# Patient Record
Sex: MALE | Race: WHITE | NOT HISPANIC OR LATINO | URBAN - METROPOLITAN AREA
[De-identification: names, ages, dates, MRNs, and addresses within clinical notes are randomized per-mention and may not be internally consistent; named-entity substitution may affect disease eponyms.]

---

## 2023-09-24 PROBLEM — J34.2 DEVIATED NASAL SEPTUM: Status: ACTIVE | Noted: 2023-09-24

## 2023-09-24 PROBLEM — H90.3 SENSORINEURAL HEARING LOSS (SNHL), BILATERAL: Status: ACTIVE | Noted: 2023-09-24

## 2023-09-24 PROBLEM — H93.293 AUDITORY DISCRIMINATION IMPAIRMENT, BILATERAL: Status: ACTIVE | Noted: 2023-09-24

## 2023-09-24 RX ORDER — BIMATOPROST 0.1 MG/ML
SOLUTION/ DROPS OPHTHALMIC
COMMUNITY
Start: 2016-07-29

## 2023-09-24 RX ORDER — ESCITALOPRAM OXALATE 10 MG/1
TABLET ORAL
COMMUNITY
Start: 2020-09-16

## 2023-09-24 RX ORDER — DORZOLAMIDE HYDROCHLORIDE AND TIMOLOL MALEATE 20; 5 MG/ML; MG/ML
SOLUTION/ DROPS OPHTHALMIC
COMMUNITY

## 2023-10-20 NOTE — PROGRESS NOTES
Subjective   Patient ID: Jovan Benito is a 77 y.o. male who presents for No chief complaint on file..  HPI  This 77-year-old male is being seen back in the office for recheck of his ears and hearing.  He does have a history of moderate to severe sensorineural hearing loss in both ears.  He is also had some difficulties with ear canal dryness in the past.  His general function in the head and neck has been stable throughout the year.  No new health issues have arisen.    Objective   Physical Exam  EXAMINATION:     GENERAL JAKE.EARANCE: Alert, in no acute distress, normal pitch and clarity of voice, well-developed and nourished, cooperative.     HEAD/FACE: Normocephalic, atraumatic, normal facial movements and strength, no no tenderness to palpation, no lesions noted.     SKIN: Normal turgor, no raised or ulcerative lesions, warm and dry to palpation.     EYES: Extraocular motions intact, no nystagmus noted, pupils equal and reactive to light and accommodation, no conjunctivitis.     EARS: Both ears--external ear anatomy is normal without lesions, auditory canals are patent and without skin abrasions or lesions, ear canal dryness is noted bilaterally hearing is intact to voice, tympanic membranes are intact with no acute inflammation, light reflexes present, no effusions are noted and no mastoid tenderness found to palpation.     NEUROLOGIC/PSYCH; alert and oriented, cranial nerves are grossly intact, gait is without falling, no motor deficits are noted.     His audiogram today continues to show a moderate low-frequency hearing loss moderately severe to severe mid to upper frequency hearing loss in both ears.  84% discrimination ability is noted bilaterally at elevated thresholds.  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 Since the last test there has been no significant change in the hearing of individual frequencies sound. Discrimination ability remains basically unchanged. It would be advised that a yearly audiogram be done unless symptoms develop in regards to progressive loss, new onset vertigo, or changes regarding tinnitus. Avoidance of loud noise without ear protection is advised. Rehabilitation using hearing aids is advised.  At the present time there were no active issues requiring intervention and a yearly follow-up is advised.

## 2023-10-24 ENCOUNTER — OFFICE VISIT (OUTPATIENT)
Dept: OTOLARYNGOLOGY | Facility: CLINIC | Age: 78
End: 2023-10-24
Payer: MEDICARE

## 2023-10-24 ENCOUNTER — CLINICAL SUPPORT (OUTPATIENT)
Dept: AUDIOLOGY | Facility: CLINIC | Age: 78
End: 2023-10-24
Payer: MEDICARE

## 2023-10-24 VITALS
DIASTOLIC BLOOD PRESSURE: 82 MMHG | HEIGHT: 72 IN | SYSTOLIC BLOOD PRESSURE: 134 MMHG | BODY MASS INDEX: 26.41 KG/M2 | WEIGHT: 195 LBS | HEART RATE: 56 BPM

## 2023-10-24 DIAGNOSIS — H90.3 SENSORINEURAL HEARING LOSS (SNHL), BILATERAL: Primary | ICD-10-CM

## 2023-10-24 DIAGNOSIS — J34.2 DEVIATED NASAL SEPTUM: ICD-10-CM

## 2023-10-24 DIAGNOSIS — H61.893 DRYNESS OF BOTH EAR CANALS: ICD-10-CM

## 2023-10-24 DIAGNOSIS — H93.293 AUDITORY DISCRIMINATION IMPAIRMENT, BILATERAL: ICD-10-CM

## 2023-10-24 PROBLEM — D12.3 ADENOMA OF COLON AT HEPATIC FLEXURE: Status: ACTIVE | Noted: 2019-06-11

## 2023-10-24 PROBLEM — R55 SYNCOPE AND COLLAPSE: Status: ACTIVE | Noted: 2023-05-25

## 2023-10-24 PROBLEM — E78.5 DYSLIPIDEMIA: Status: ACTIVE | Noted: 2023-05-25

## 2023-10-24 PROBLEM — D12.0 ADENOMA OF CECUM: Status: ACTIVE | Noted: 2019-06-11

## 2023-10-24 PROBLEM — I70.0 AORTIC CALCIFICATION (CMS-HCC): Status: ACTIVE | Noted: 2023-05-25

## 2023-10-24 PROCEDURE — 99213 OFFICE O/P EST LOW 20 MIN: CPT | Performed by: OTOLARYNGOLOGY

## 2023-10-24 PROCEDURE — 92557 COMPREHENSIVE HEARING TEST: CPT | Performed by: AUDIOLOGIST

## 2023-10-24 PROCEDURE — 1159F MED LIST DOCD IN RCRD: CPT | Performed by: OTOLARYNGOLOGY

## 2023-10-24 PROCEDURE — 1036F TOBACCO NON-USER: CPT | Performed by: OTOLARYNGOLOGY

## 2023-10-24 PROCEDURE — 1160F RVW MEDS BY RX/DR IN RCRD: CPT | Performed by: OTOLARYNGOLOGY

## 2023-10-24 RX ORDER — ROSUVASTATIN CALCIUM 20 MG/1
20 TABLET, COATED ORAL DAILY
COMMUNITY
Start: 2023-09-02

## 2023-10-24 RX ORDER — MELOXICAM 15 MG/1
15 TABLET ORAL DAILY
COMMUNITY
Start: 2016-02-22

## 2023-10-24 NOTE — PROGRESS NOTES
COMPREHENSIVE AUDIOMETRIC EVALUATION      Name:  Jovan Benito  :  1945  Age:  77 y.o.  Date of Evaluation:  10/24/23   Referring Provider:  Dr. Vaughn     History:  Mr. Benito was seen today for an evaluation of hearing.  Please recall, patient has been a longstanding patient of our clinic and utilizes hearing aids, bilaterally.  Patient denied significant changes for decreased hearing sensitivity and when asked, denied otalgia, aural fullness, and tinnitus.    OTOSCOPY:       Right Ear: Clear canal       Left Ear: Clear canal    AUDIOMETRIC EVALUATION (Phones):       Right Ear: Essentially Moderately severe sloping to Profound Sensorineural hearing loss                 Left Ear: Essentially Moderately severe sloping to Profound Sensorineural hearing loss         NOTE: Hearing sensitivity consistent with last audiometric evaluation 10/12/2022.    Test technique:  Standard Audiometry  Reliability:   good    SPEECH RECOGNITION THRESHOLD:       Right Ear:  65 dBHL in fair with PTA       Left Ear:  60 dBHL in good with PTA    WORD RECOGNITION:       Right Ear:  good (84%) at elevated presentation level       Left Ear:  good (84%) at elevated presentation level    RECOMMENDATIONS:  -Recommend patient continue with scheduled ENT and hearing aid check appointments.  -Recommend patient return in approximately a year for monitoring of hearing sensitivity or sooner should concerns for changes in hearing sensitivity arise    Melo Vee, CCC-A     Appt: 8:30 - 9:00 AM

## 2023-10-24 NOTE — PROGRESS NOTES
[facility]  HEARING AID CHECK      Name:  Jovan Benito  YOB: 1945  Today's date:  10/24/23      PATIENT ISSUES/CONCERNS AND OTOSCOPIC RESULTS   The patient was seen earlier today for an ear cleaning and audiogram update.   Patient reports that he has been hearing well since our visit last month.    HEARING AID INSPECTION AND ADJUSTMENT  Hearing aids were recently cleaned.  Listening check was good.    Patient reports no issues or concerns.  No need for adjustment today.    FOLLOW UP   The patient was asked to contact the office if they notice any significant change in hearing level or hearing aid function.    Otherwise, will follow up again in 1 year in conjunction with Dr. Vaughn visit.    APPOINTMENT TIME  9:30-10:00am    Melo Beauchamp  Doctor of Audiology  Licensed Audiologist

## 2024-04-11 ENCOUNTER — CLINICAL SUPPORT (OUTPATIENT)
Dept: AUDIOLOGY | Facility: CLINIC | Age: 79
End: 2024-04-11
Payer: MEDICARE

## 2024-04-11 DIAGNOSIS — H90.3 SENSORINEURAL HEARING LOSS (SNHL), BILATERAL: Primary | ICD-10-CM

## 2024-04-11 NOTE — PROGRESS NOTES
[facility]  HEARING AID EVALUATION      Name:  Jovan Benito  YOB: 1945  Today's date:  04/11/24      PATIENT LISTENING NEEDS  Patient would like to replace his 10+ year old ReSound MILES's.    TECHNOLOGY DISCUSSION AND RECOMMENDATION  Discussed options.  Will order binaural ReSound Nexia 960s aids in champagne with custom micromold receivers.  Patient also want a new TV streamer.    FOLLOW UP    2-3 weeks    APPOINTMENT TIME  2:30-3:30pm    Melo Beauchamp  Doctor of Audiology  Senior Audiologist

## 2024-04-26 ENCOUNTER — CLINICAL SUPPORT (OUTPATIENT)
Dept: AUDIOLOGY | Facility: CLINIC | Age: 79
End: 2024-04-26
Payer: MEDICARE

## 2024-04-26 DIAGNOSIS — H90.3 SENSORINEURAL HEARING LOSS (SNHL), BILATERAL: Primary | ICD-10-CM

## 2024-04-26 DIAGNOSIS — H93.293 AUDITORY DISCRIMINATION IMPAIRMENT, BILATERAL: ICD-10-CM

## 2024-04-26 PROCEDURE — V5261 HEARING AID, DIGIT, BIN, BTE: HCPCS | Performed by: AUDIOLOGIST

## 2024-04-26 PROCEDURE — V5160 DISPENSING FEE BINAURAL: HCPCS | Performed by: AUDIOLOGIST

## 2024-04-26 NOTE — PROGRESS NOTES
[facility]  HEARING AID FITTING      Name:  Jovan Benito  YOB: 1945  Today's date:  04/26/24      DEVICES FIT TODAY  Fit the patient with binaural ReSound Nexia 960s hearing aids.    VERIFICATION MEASUREMENTS/OBJECTIVE RESULTS  Excellent match to VSM target set to an experienced user level.    PATIENT REPORT/SUBJECTIVE RESULTS   Decreased overall gain 3 steps for comfort.  Otherwise patient very pleased with sound quality.    CONNECTIVITY  Un-paired the patient's old hearing aids and deleted the older ReSound cruz.  Paired the newer aids, installed the newer Cruz and connected.  Verified functionality in the office.    PATIENT INSTRUCTION  Feedback manager was set and no feedback was observed.  Indicator tones were demonstrated for the patient.  Care and maintenance of the device were discussed with the patient.      The patient was able to properly insert and remove the hearing instrument from the ear.      In addition to today's verbal instruction of the hearing devices, the patient was given written instructions from the hearing aid . Hearing aid limitations were discussed at length as well as realistic expectations.  The patient was advised in order to receive full benefit of amplification, consistent use during all waking hours is recommended.     POTENTIAL FOLLOW UP CONCERNS TO ADDRESS   Patient is an experienced hearing aid user and should do well.    FOLLOW UP    2-3 weeks    APPOINTMENT TIME  8:30-9:30am    Melo Beauchamp  Doctor of Audiology  Senior Audiologist

## 2024-05-09 ENCOUNTER — APPOINTMENT (OUTPATIENT)
Dept: AUDIOLOGY | Facility: CLINIC | Age: 79
End: 2024-05-09
Payer: MEDICARE

## 2024-05-14 ENCOUNTER — APPOINTMENT (OUTPATIENT)
Dept: AUDIOLOGY | Facility: CLINIC | Age: 79
End: 2024-05-14
Payer: MEDICARE

## 2024-05-14 ENCOUNTER — CLINICAL SUPPORT (OUTPATIENT)
Dept: AUDIOLOGY | Facility: CLINIC | Age: 79
End: 2024-05-14

## 2024-05-14 DIAGNOSIS — H93.293 AUDITORY DISCRIMINATION IMPAIRMENT, BILATERAL: ICD-10-CM

## 2024-05-14 DIAGNOSIS — H90.3 SENSORINEURAL HEARING LOSS (SNHL), BILATERAL: Primary | ICD-10-CM

## 2024-05-14 PROCEDURE — HRANC PR HEARING AID NO CHARGE: Performed by: AUDIOLOGIST

## 2024-05-14 NOTE — PROGRESS NOTES
[facility]  NEW HEARING AID CHECK      Name:  Jovan Benito  YOB: 1945  Today's date:  05/14/24      PATIENT ISSUES/CONCERNS AND OTOSCOPIC RESULTS  Otoscopic was clear.  Patient reports that he has been doing very well with his new hearing aids.    HEARING AID INSPECTION AND ADJUSTMENT  Hearing aids were well maintained.  Listening check was good.  Analyzer data good.      Added a very mild Sound Shaper to the All Around program.  Otherwise doing very well.    FOLLOW UP   The patient was asked to contact the office if they notice any significant change in hearing level or hearing aid function.    Otherwise, will follow up again in October as previously scheduled.    APPOINTMENT TIME  3:00-3:30pm    Melo Beauchamp  Doctor of Audiology  Senior Audiologist

## 2024-10-24 ENCOUNTER — APPOINTMENT (OUTPATIENT)
Dept: AUDIOLOGY | Facility: CLINIC | Age: 79
End: 2024-10-24
Payer: MEDICARE

## 2024-10-24 ENCOUNTER — APPOINTMENT (OUTPATIENT)
Dept: OTOLARYNGOLOGY | Facility: CLINIC | Age: 79
End: 2024-10-24
Payer: MEDICARE

## 2024-11-22 NOTE — PROGRESS NOTES
"Subjective   Patient ID: Jovan Benito is a 79 y.o. male who presents for Follow-up.  HPI  This 79 -year-old male is being seen back in the office for recheck of his ears and hearing.  He does have a history of moderate to severe sensorineural hearing loss in both ears.  He is also had some difficulties with ear canal dryness in the past.  His general function in the head and neck has been stable throughout the year.  No new health issues have arisen.  He denies difficulties with vertigo and has had no problems with tinnitus.  He seems to be compensating for speech audiometry difficulties throughout the day.  There is been no difficulties with respiratory health and has had no swallowing or voice problems.  Review of Systems   A 12 point ROS  has been reviewed and are negative for complaint except for what is stated in the history of present illness and /or for past medical history as noted in the EMR.     History reviewed. No pertinent past medical history.       Current Outpatient Medications:     bimatoprost (Lumigan) 0.01 % ophthalmic solution, Administer into affected eye(s)., Disp: , Rfl:     dorzolamide-timoloL (Cosopt) 22.3-6.8 mg/mL ophthalmic solution, Administer into affected eye(s)., Disp: , Rfl:     escitalopram (Lexapro) 10 mg tablet, Take by mouth., Disp: , Rfl:     meloxicam (Mobic) 15 mg tablet, Take 1 tablet (15 mg) by mouth once daily., Disp: , Rfl:     rosuvastatin (Crestor) 20 mg tablet, Take 1 tablet (20 mg) by mouth once daily., Disp: , Rfl:      No Known Allergies    Height 1.803 m (5' 11\"), weight 86.2 kg (190 lb).    Objective   Physical Exam  EXAMINATION:     GENERAL JAKE.EARANCE: Alert, in no acute distress, normal pitch and clarity of voice, well-developed and nourished, cooperative.     HEAD/FACE: Normocephalic, atraumatic, normal facial movements and strength, no no tenderness to palpation, no lesions noted.     SKIN: Normal turgor, no raised or ulcerative lesions, warm and dry to " palpation.     EYES: Extraocular motions intact, no nystagmus noted, pupils equal and reactive to light and accommodation, no conjunctivitis.     EARS: Both ears--external ear anatomy is normal without lesions, auditory canals are patent and without skin abrasions or lesions, ear canal dryness is noted bilaterally hearing is intact to voice, tympanic membranes are intact with no acute inflammation, light reflexes present, no effusions are noted and no mastoid tenderness found to palpation.     NEUROLOGIC/PSYCH; alert and oriented, cranial nerves are grossly intact, gait is without falling, no motor deficits are noted.     His audiogram today in both ears continues to be positive for a moderate sensorineural hearing loss up through 2000 Hz in both ears moderately severe to severe in the remaining upper frequencies of sound and a sloping fashion.  Discrimination scores are 76% on the right 72% on the left at 90 dB presentation level.  This is slightly decreased from 1 year ago.  Tympanogram was normal on the left unable to be obtained on the right.  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 Since the last test there has been no significant change in the hearing of individual frequencies sound. Discrimination ability has slightly reduced compared to 1 year ago.. It would be advised that a yearly audiogram be done unless symptoms develop in regards to progressive loss, new onset vertigo, or changes regarding tinnitus. Avoidance of loud noise without ear protection is advised. Rehabilitation using hearing aids is advised.     This patient is advised to follow up with their PCP for all  other health care issues and treatment. Dictation was done with dragon transcription and errors in spelling  and diction are possible.  Lawrence Vaughn DMD, MD 11/26/24 3:51 PM

## 2024-11-26 ENCOUNTER — APPOINTMENT (OUTPATIENT)
Dept: AUDIOLOGY | Facility: CLINIC | Age: 79
End: 2024-11-26
Payer: MEDICARE

## 2024-11-26 ENCOUNTER — APPOINTMENT (OUTPATIENT)
Dept: OTOLARYNGOLOGY | Facility: CLINIC | Age: 79
End: 2024-11-26
Payer: MEDICARE

## 2024-11-26 VITALS — BODY MASS INDEX: 26.6 KG/M2 | WEIGHT: 190 LBS | HEIGHT: 71 IN

## 2024-11-26 DIAGNOSIS — H93.293 AUDITORY DISCRIMINATION IMPAIRMENT, BILATERAL: ICD-10-CM

## 2024-11-26 DIAGNOSIS — H61.893 DRYNESS OF BOTH EAR CANALS: ICD-10-CM

## 2024-11-26 DIAGNOSIS — H90.3 SENSORINEURAL HEARING LOSS (SNHL), BILATERAL: Primary | ICD-10-CM

## 2024-11-26 DIAGNOSIS — J34.2 DEVIATED NASAL SEPTUM: ICD-10-CM

## 2024-11-26 PROBLEM — M43.16 SPONDYLOLISTHESIS OF LUMBAR REGION: Status: ACTIVE | Noted: 2018-12-24

## 2024-11-26 PROBLEM — N40.0 ENLARGED PROSTATE: Status: ACTIVE | Noted: 2024-06-03

## 2024-11-26 PROBLEM — F33.0 MILD EPISODE OF RECURRENT MAJOR DEPRESSIVE DISORDER (CMS-HCC): Status: ACTIVE | Noted: 2024-06-03

## 2024-11-26 PROBLEM — G25.0 BENIGN ESSENTIAL TREMOR: Status: ACTIVE | Noted: 2024-06-03

## 2024-11-26 PROBLEM — G60.9 IDIOPATHIC PERIPHERAL NEUROPATHY: Status: ACTIVE | Noted: 2024-11-26

## 2024-11-26 PROBLEM — E78.2 MIXED HYPERLIPIDEMIA: Status: ACTIVE | Noted: 2024-06-03

## 2024-11-26 PROBLEM — R20.2 PARESTHESIA OF SKIN: Status: ACTIVE | Noted: 2024-11-26

## 2024-11-26 PROBLEM — M76.31 ILIOTIBIAL BAND SYNDROME OF RIGHT SIDE: Status: ACTIVE | Noted: 2020-08-11

## 2024-11-26 PROBLEM — J30.2 SEASONAL ALLERGIC RHINITIS: Status: ACTIVE | Noted: 2024-06-03

## 2024-11-26 PROBLEM — H40.9 UNSPECIFIED GLAUCOMA: Status: ACTIVE | Noted: 2024-06-03

## 2024-11-26 PROBLEM — K63.5 POLYP OF COLON: Status: ACTIVE | Noted: 2024-06-03

## 2024-11-26 PROBLEM — M19.011 ARTHRITIS OF RIGHT ACROMIOCLAVICULAR JOINT: Status: ACTIVE | Noted: 2024-06-03

## 2024-11-26 PROBLEM — F32.A DEPRESSION: Status: ACTIVE | Noted: 2024-06-03

## 2024-11-26 PROBLEM — N40.0 BENIGN PROSTATIC HYPERPLASIA WITHOUT URINARY OBSTRUCTION: Status: ACTIVE | Noted: 2024-06-03

## 2024-11-26 PROBLEM — S73.199A TEAR OF ACETABULAR LABRUM: Status: ACTIVE | Noted: 2019-01-03

## 2024-11-26 PROBLEM — M51.369 DEGENERATION OF INTERVERTEBRAL DISC OF LUMBAR REGION: Status: ACTIVE | Noted: 2020-10-14

## 2024-11-26 PROBLEM — K21.9 GASTROESOPHAGEAL REFLUX DISEASE WITHOUT ESOPHAGITIS: Status: ACTIVE | Noted: 2024-06-03

## 2024-11-26 PROBLEM — M47.812 CERVICAL ARTHRITIS: Status: ACTIVE | Noted: 2024-06-03

## 2024-11-26 PROBLEM — F41.1 GAD (GENERALIZED ANXIETY DISORDER): Status: ACTIVE | Noted: 2024-06-03

## 2024-11-26 PROBLEM — I35.1 TRACE AORTIC REGURGITATION BY PRIOR ECHOCARDIOGRAM: Status: ACTIVE | Noted: 2024-06-03

## 2024-11-26 PROBLEM — M47.812 SPONDYLOSIS OF CERVICAL REGION WITHOUT MYELOPATHY OR RADICULOPATHY: Status: ACTIVE | Noted: 2024-11-26

## 2024-11-26 PROBLEM — M89.9 DISORDER OF BONE: Status: ACTIVE | Noted: 2018-12-26

## 2024-11-26 PROCEDURE — 1160F RVW MEDS BY RX/DR IN RCRD: CPT | Performed by: OTOLARYNGOLOGY

## 2024-11-26 PROCEDURE — HRANC PR HEARING AID NO CHARGE: Performed by: AUDIOLOGIST

## 2024-11-26 PROCEDURE — 1159F MED LIST DOCD IN RCRD: CPT | Performed by: OTOLARYNGOLOGY

## 2024-11-26 PROCEDURE — 1036F TOBACCO NON-USER: CPT | Performed by: OTOLARYNGOLOGY

## 2024-11-26 PROCEDURE — 92557 COMPREHENSIVE HEARING TEST: CPT | Performed by: AUDIOLOGIST

## 2024-11-26 PROCEDURE — 92567 TYMPANOMETRY: CPT | Performed by: AUDIOLOGIST

## 2024-11-26 PROCEDURE — 99214 OFFICE O/P EST MOD 30 MIN: CPT | Performed by: OTOLARYNGOLOGY

## 2024-11-26 NOTE — PROGRESS NOTES
COMPREHENSIVE AUDIOMETRIC EVALUATION      Name:  Jovan Benito  :  1945  Age:  79 y.o.  Date of Evaluation:  24   Referring Provider:  Lawrence Vaughn DMD, MD     History:  Mr. Benito was seen today for an evaluation of hearing.  Patient reported he is unsure if hearing sensitivity has changed or if he is just having difficulty with his new hearing aids..  Please recall, patient has been a longstanding patient of our clinic and utilizes hearing aids, bilaterally.  When asked, patient denied otalgia, aural fullness, and tinnitus.    See audiometric evaluation at end of this report or scanned under media tab    OTOSCOPY:       Right Ear: Clear canal       Left Ear: Clear canal    226 Hz TYMPANOMETRY:       Right Ear: Could not seal       Left Ear: Type A: normal peak pressure, compliance, and ear canal volume, consistent with middle ear function within normal limits    AUDIOMETRIC EVALUATION (Phones):       Right Ear: Moderately severe sloping to Profound, Sensorineural hearing loss                 Left Ear: Moderate sloping to Profound, Sensorineural hearing loss           NOTE: Hearing sensitivity consistent with most recent audiometric evaluation 10/24/2023    Test technique:  Standard Audiometry  Reliability:   good    SPEECH RECOGNITION THRESHOLD:       Right Ear:  65 dBHL in good agreement with PTA       Left Ear:  60 dBHL in good agreement with PTA    WORD RECOGNITION:       Right Ear:  76% fair (66-76%) at elevated presentation level       Left Ear:   72% fair (66-76%) at elevated presentation level    NOTE: Decreased WRS as compared to previous evaluation 10/24/2023 (R: 84%, L: 84%)    DISCUSSION:   Discussed results and recommendations with patient.  Questions were addressed and the patient was encouraged to contact our department should concerns arise.    RECOMMENDATIONS:  -Recommend patient continue consistent utilization of hearing aids and follow up with audiologist.  -Recommend patient  return for repeated audiometric evaluation should concerns for changes in hearing sensitivity arise or as medically indicated.    Melo Vee, CCC-A     Appt: 3:00 - 3:30 PM

## 2024-11-26 NOTE — PROGRESS NOTES
[facility]  HEARING AID CHECK      Name:  Jovan Benito  YOB: 1945  Today's date:  11/26/24      PATIENT ISSUES/CONCERNS AND OTOSCOPIC RESULTS  Otoscopic was clear.  Patient reports that the hearing aids have been working well.    HEARING AID INSPECTION AND ADJUSTMENT  Hearing aids were cleaned and checked.  Replaced filters and cleaned vents.  Listening check was good.  Analyzer data good.      Programmed to today's audiogram adding additional gain to the right aid.  Good subjective audibility noted in the office.    Reviewed some Bluetooth troubleshooting.    FOLLOW UP   The patient was asked to contact the office if they notice any significant change in hearing level or hearing aid function.    Otherwise, will follow up again in 1 year.    APPOINTMENT TIME  4:00pm-4:30pm    Melo Beauchamp  Doctor of Audiology  Senior Audiologist

## 2025-01-13 NOTE — PROGRESS NOTES
Subjective   Patient ID: Jovan Benito is a 79 y.o. male who presents for No chief complaint on file..  HPI  This 79 -year-old male is being seen back in the office for recheck of his ears and hearing.  He does have a history of moderate to severe sensorineural hearing loss in both ears.  He is also had some difficulties with ear canal dryness in the past.  His general function in the head and neck has been stable throughout the year.  He apparently just recovered from a bout of COVID and has had a subjective change in hearing.  Review of Systems   A 12 point ROS  has been reviewed and are negative for complaint except for what is stated in the history of present illness and /or for past medical history as noted in the EMR.    No past medical history on file.       Current Outpatient Medications:     bimatoprost (Lumigan) 0.01 % ophthalmic solution, Administer into affected eye(s)., Disp: , Rfl:     dorzolamide-timoloL (Cosopt) 22.3-6.8 mg/mL ophthalmic solution, Administer into affected eye(s)., Disp: , Rfl:     escitalopram (Lexapro) 10 mg tablet, Take by mouth., Disp: , Rfl:     meloxicam (Mobic) 15 mg tablet, Take 1 tablet (15 mg) by mouth once daily., Disp: , Rfl:     rosuvastatin (Crestor) 20 mg tablet, Take 1 tablet (20 mg) by mouth once daily., Disp: , Rfl:      No Known Allergies    There were no vitals taken for this visit.    Objective   Physical Exam  EXAMINATION:     GENERAL JAKE.EARANCE: Alert, in no acute distress, normal pitch and clarity of voice, well-developed and nourished, cooperative.     HEAD/FACE: Normocephalic, atraumatic, normal facial movements and strength, no no tenderness to palpation, no lesions noted.     SKIN: Normal turgor, no raised or ulcerative lesions, warm and dry to palpation.     EYES: Extraocular motions intact, no nystagmus noted, pupils equal and reactive to light and accommodation, no conjunctivitis.     EARS: Both ears--external ear anatomy is normal without lesions,  auditory canals are patent and without skin abrasions or lesions, ear canal dryness is noted bilaterally hearing is intact to voice, tympanic membranes are intact with no acute inflammation, light reflexes present, no effusions are noted and no mastoid tenderness found to palpation.     NOSE--intranasally there was no evidence for purulent nasal discharge.  Turbinates were not obstructive.  Color the mucous membranes was normal.    ORAL/PHAR--oral cavity pharyngeal examination was negative for postnasal discharge in the oropharynx.  There was no evidence for acute inflammation of mucous membrane surfaces.  Floor the mouth tongue was all within normal limits.    NECK--neck palpation was negative for adenopathy there is no evidence for areas of inflammation or tenderness.    NEUROLOGIC/PSYCH; alert and oriented, cranial nerves are grossly intact, gait is without falling, no motor deficits are noted.     Repeat audiogram today continues to show in both ears a moderately severe hearing loss up through 2000 Hz severe in the remaining upper frequencies of sound unchanged from November's study.  Discrimination scores remained at 76% on the right ear at 95 dB 72% on the left at 90 dB.  Thus thresholds are not changed.  Compared to an audiogram from October 2023 discrimination scores are lower at the same thresholds.  On that old test discrimination was 84% bilaterally at the same thresholds.  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  His exam today did not show evidence for acute inflammation in the head and neck.  His audiogram is also unchanged from November's study.   Discrimination ability remains in the 70+ percent range at elevated thresholds.  This had decreased compared to October 2023 or discrimination was 84% bilaterally at the same thresholds.  We discussed cochlear implants as additional ways of improving hearing especially when hearing aid function is no longer helpful.  At this point I still feel he would be best served by continuing with amplification with his aids.  If further discrimination changes occur then that should then be readdressed.  Recheck in 1 year is advised unless problems develop sooner.       Lawrence Vaughn DMD, MD 01/13/25 12:55 PM

## 2025-01-14 ENCOUNTER — CLINICAL SUPPORT (OUTPATIENT)
Dept: AUDIOLOGY | Facility: CLINIC | Age: 80
End: 2025-01-14
Payer: MEDICARE

## 2025-01-14 ENCOUNTER — OFFICE VISIT (OUTPATIENT)
Dept: OTOLARYNGOLOGY | Facility: CLINIC | Age: 80
End: 2025-01-14
Payer: MEDICARE

## 2025-01-14 DIAGNOSIS — Z86.16 HISTORY OF COVID-19: ICD-10-CM

## 2025-01-14 DIAGNOSIS — H93.293 AUDITORY DISCRIMINATION IMPAIRMENT, BILATERAL: ICD-10-CM

## 2025-01-14 DIAGNOSIS — J34.2 DEVIATED NASAL SEPTUM: ICD-10-CM

## 2025-01-14 DIAGNOSIS — H90.3 SENSORINEURAL HEARING LOSS (SNHL), BILATERAL: Primary | ICD-10-CM

## 2025-01-14 DIAGNOSIS — H61.893 DRYNESS OF BOTH EAR CANALS: ICD-10-CM

## 2025-01-14 PROCEDURE — 1160F RVW MEDS BY RX/DR IN RCRD: CPT | Performed by: OTOLARYNGOLOGY

## 2025-01-14 PROCEDURE — 1159F MED LIST DOCD IN RCRD: CPT | Performed by: OTOLARYNGOLOGY

## 2025-01-14 PROCEDURE — HRANC PR HEARING AID NO CHARGE: Performed by: AUDIOLOGIST

## 2025-01-14 PROCEDURE — 1036F TOBACCO NON-USER: CPT | Performed by: OTOLARYNGOLOGY

## 2025-01-14 PROCEDURE — 92567 TYMPANOMETRY: CPT | Performed by: AUDIOLOGIST

## 2025-01-14 PROCEDURE — 92557 COMPREHENSIVE HEARING TEST: CPT | Performed by: AUDIOLOGIST

## 2025-01-14 PROCEDURE — 99213 OFFICE O/P EST LOW 20 MIN: CPT | Performed by: OTOLARYNGOLOGY

## 2025-01-14 RX ORDER — LISINOPRIL 20 MG/1
1 TABLET ORAL
COMMUNITY
Start: 2024-12-16

## 2025-01-14 NOTE — PROGRESS NOTES
[facility]  AUDIOMETRIC EVALUATION      Name:  Jovan Benito   :  1945  Age:  79 y.o.  Date of Evaluation:  25    HISTORY    Jovan Benito is seen today at the request of Lawrence Vaughn M.D., SOPHIA., F.A.C.S.  The patient is  an established patient concerned about decreased hearing post COVID    EVALUATION  See scanned audiogram in Media and included at the end of this report.    RESULTS    Otoscopic Evaluation:  Right Ear:  clear   Left Ear:  clear    Tympanometry:   Right Ear:  Type A, consistent with normal eardrum mobility and middle ear pressure   Left Ear:  Type A, consistent with normal eardrum mobility and middle ear pressure    Acoustic reflexes were not completed    Pure Tone Audiometry:    Right Ear:  moderate to profound sensorineural hearing loss  Left Ear:  moderate to profound sensorineural hearing loss       Speech Audiometry:    Right Ear:  fair in quiet at an elevated presentation level  Left Ear:  fair in quiet at an elevated presentation level  Speech reception thresholds were in good agreement with pure tone testing.    DISCUSSION  Results were relayed to Lawrence Vaughn M.D., SOPHIA., F.A.C.S.    APPOINTMENT TIME  1:30pm-2:00pm     Pepe Beauchamp  Doctor of Audiology  Senior Audiologist

## 2025-01-14 NOTE — PROGRESS NOTES
[facility]  HEARING AID CHECK      Name:  Jovan Benito  YOB: 1945  Today's date:  01/14/25      PATIENT ISSUES/CONCERNS AND OTOSCOPIC RESULTS  Otoscopic was clear.  Patient reports that the hearing aids sound dull since he had COVID.  Repeat audiogram shows minimal changes.  Dr. Vaughn says ears look good.    HEARING AID INSPECTION AND ADJUSTMENT  Left aid with a weak listening check on arrival.  Right aid clear.  Hearing aids were cleaned and checked preventatively.  Replaced filters and cleaned vents.  Listening check was good.  Analyzer data good.      Programmed to today's audiogram and added additional overall gain and high Hz gain for clarity.  Good subjective audibility noted in the office.    FOLLOW UP   The patient was asked to contact the office if they notice any significant change in hearing level or hearing aid function.    Otherwise, will follow up again in December as previously scheduled.    APPOINTMENT TIME  3:30pm-4:00pm    Melo Beauchamp  Doctor of Audiology  Senior Audiologist

## 2025-04-02 ENCOUNTER — CLINICAL SUPPORT (OUTPATIENT)
Dept: AUDIOLOGY | Facility: CLINIC | Age: 80
End: 2025-04-02

## 2025-04-02 DIAGNOSIS — H90.3 SENSORINEURAL HEARING LOSS (SNHL), BILATERAL: Primary | ICD-10-CM

## 2025-04-02 PROCEDURE — HRANC PR HEARING AID NO CHARGE: Performed by: AUDIOLOGIST

## 2025-04-02 NOTE — PROGRESS NOTES
[facility]  WALK IN  HEARING AID CHECK      Name:  Jovan Benito  YOB: 1945  Today's date:  04/02/25    Patient stating that the hearing aids sound bad in the noise mode and seem scratchy with his glasses.    Replaced the case/microphone covers with resulting good listening check.    Called patient to .    [employer]  No charge under warranty    Melo Beauchamp  Doctor of Audiology  Senior Audiologist

## 2025-12-01 ENCOUNTER — APPOINTMENT (OUTPATIENT)
Dept: AUDIOLOGY | Facility: CLINIC | Age: 80
End: 2025-12-01
Payer: MEDICARE

## 2025-12-01 ENCOUNTER — APPOINTMENT (OUTPATIENT)
Dept: OTOLARYNGOLOGY | Facility: CLINIC | Age: 80
End: 2025-12-01
Payer: MEDICARE

## 2025-12-02 ENCOUNTER — APPOINTMENT (OUTPATIENT)
Dept: AUDIOLOGY | Facility: CLINIC | Age: 80
End: 2025-12-02
Payer: MEDICARE

## 2025-12-02 ENCOUNTER — APPOINTMENT (OUTPATIENT)
Dept: OTOLARYNGOLOGY | Facility: CLINIC | Age: 80
End: 2025-12-02
Payer: MEDICARE